# Patient Record
Sex: MALE | Race: WHITE | NOT HISPANIC OR LATINO | Employment: FULL TIME | URBAN - METROPOLITAN AREA
[De-identification: names, ages, dates, MRNs, and addresses within clinical notes are randomized per-mention and may not be internally consistent; named-entity substitution may affect disease eponyms.]

---

## 2022-10-22 ENCOUNTER — HOSPITAL ENCOUNTER (EMERGENCY)
Facility: CLINIC | Age: 27
Discharge: HOME | End: 2022-10-22
Attending: EMERGENCY MEDICINE | Admitting: EMERGENCY MEDICINE

## 2022-10-22 ENCOUNTER — APPOINTMENT (OUTPATIENT)
Dept: CT IMAGING | Facility: CLINIC | Age: 27
End: 2022-10-22
Attending: EMERGENCY MEDICINE

## 2022-10-22 VITALS
WEIGHT: 176.37 LBS | DIASTOLIC BLOOD PRESSURE: 69 MMHG | TEMPERATURE: 98 F | OXYGEN SATURATION: 100 % | HEART RATE: 79 BPM | SYSTOLIC BLOOD PRESSURE: 127 MMHG | RESPIRATION RATE: 20 BRPM

## 2022-10-22 DIAGNOSIS — N10 PYELONEPHRITIS, ACUTE: ICD-10-CM

## 2022-10-22 LAB
ALBUMIN UR-MCNC: 200 MG/DL
ANION GAP SERPL CALCULATED.3IONS-SCNC: 10 MMOL/L (ref 7–15)
APPEARANCE UR: ABNORMAL
BACTERIA #/AREA URNS HPF: ABNORMAL /HPF
BASOPHILS # BLD AUTO: 0.1 10E3/UL (ref 0–0.2)
BASOPHILS NFR BLD AUTO: 0 %
BILIRUB UR QL STRIP: NEGATIVE
BUN SERPL-MCNC: 11.8 MG/DL (ref 6–20)
CALCIUM SERPL-MCNC: 9.8 MG/DL (ref 8.6–10)
CAOX CRY #/AREA URNS HPF: ABNORMAL /HPF
CHLORIDE SERPL-SCNC: 99 MMOL/L (ref 98–107)
COLOR UR AUTO: YELLOW
CREAT SERPL-MCNC: 0.99 MG/DL (ref 0.67–1.17)
DEPRECATED HCO3 PLAS-SCNC: 27 MMOL/L (ref 22–29)
EOSINOPHIL # BLD AUTO: 0.1 10E3/UL (ref 0–0.7)
EOSINOPHIL NFR BLD AUTO: 0 %
ERYTHROCYTE [DISTWIDTH] IN BLOOD BY AUTOMATED COUNT: 12.1 % (ref 10–15)
GFR SERPL CREATININE-BSD FRML MDRD: >90 ML/MIN/1.73M2
GLUCOSE SERPL-MCNC: 89 MG/DL (ref 70–99)
GLUCOSE UR STRIP-MCNC: NEGATIVE MG/DL
HCT VFR BLD AUTO: 42.9 % (ref 40–53)
HGB BLD-MCNC: 14 G/DL (ref 13.3–17.7)
HGB UR QL STRIP: NEGATIVE
HYALINE CASTS: 2 /LPF
IMM GRANULOCYTES # BLD: 0.1 10E3/UL
IMM GRANULOCYTES NFR BLD: 0 %
KETONES UR STRIP-MCNC: ABNORMAL MG/DL
LEUKOCYTE ESTERASE UR QL STRIP: ABNORMAL
LYMPHOCYTES # BLD AUTO: 2.6 10E3/UL (ref 0.8–5.3)
LYMPHOCYTES NFR BLD AUTO: 14 %
MCH RBC QN AUTO: 30.3 PG (ref 26.5–33)
MCHC RBC AUTO-ENTMCNC: 32.6 G/DL (ref 31.5–36.5)
MCV RBC AUTO: 93 FL (ref 78–100)
MONOCYTES # BLD AUTO: 1.3 10E3/UL (ref 0–1.3)
MONOCYTES NFR BLD AUTO: 7 %
MUCOUS THREADS #/AREA URNS LPF: PRESENT /LPF
NEUTROPHILS # BLD AUTO: 14.1 10E3/UL (ref 1.6–8.3)
NEUTROPHILS NFR BLD AUTO: 79 %
NITRATE UR QL: NEGATIVE
NRBC # BLD AUTO: 0 10E3/UL
NRBC BLD AUTO-RTO: 0 /100
PH UR STRIP: 6.5 [PH] (ref 5–7)
PLATELET # BLD AUTO: 338 10E3/UL (ref 150–450)
POTASSIUM SERPL-SCNC: 4 MMOL/L (ref 3.4–5.3)
RBC # BLD AUTO: 4.62 10E6/UL (ref 4.4–5.9)
RBC URINE: 9 /HPF
SODIUM SERPL-SCNC: 136 MMOL/L (ref 136–145)
SP GR UR STRIP: 1.03 (ref 1–1.03)
SPERM #/AREA URNS HPF: PRESENT /HPF
UROBILINOGEN UR STRIP-MCNC: NORMAL MG/DL
WBC # BLD AUTO: 18.2 10E3/UL (ref 4–11)
WBC URINE: 154 /HPF

## 2022-10-22 PROCEDURE — 80048 BASIC METABOLIC PNL TOTAL CA: CPT | Performed by: EMERGENCY MEDICINE

## 2022-10-22 PROCEDURE — 74176 CT ABD & PELVIS W/O CONTRAST: CPT

## 2022-10-22 PROCEDURE — 96365 THER/PROPH/DIAG IV INF INIT: CPT

## 2022-10-22 PROCEDURE — 87086 URINE CULTURE/COLONY COUNT: CPT | Performed by: EMERGENCY MEDICINE

## 2022-10-22 PROCEDURE — 36415 COLL VENOUS BLD VENIPUNCTURE: CPT | Performed by: EMERGENCY MEDICINE

## 2022-10-22 PROCEDURE — 81001 URINALYSIS AUTO W/SCOPE: CPT | Performed by: EMERGENCY MEDICINE

## 2022-10-22 PROCEDURE — 96361 HYDRATE IV INFUSION ADD-ON: CPT

## 2022-10-22 PROCEDURE — 96375 TX/PRO/DX INJ NEW DRUG ADDON: CPT

## 2022-10-22 PROCEDURE — 85025 COMPLETE CBC W/AUTO DIFF WBC: CPT | Performed by: EMERGENCY MEDICINE

## 2022-10-22 PROCEDURE — 99285 EMERGENCY DEPT VISIT HI MDM: CPT | Mod: 25

## 2022-10-22 PROCEDURE — 250N000011 HC RX IP 250 OP 636: Performed by: EMERGENCY MEDICINE

## 2022-10-22 PROCEDURE — 258N000003 HC RX IP 258 OP 636: Performed by: EMERGENCY MEDICINE

## 2022-10-22 RX ORDER — CIPROFLOXACIN 2 MG/ML
400 INJECTION, SOLUTION INTRAVENOUS ONCE
Status: COMPLETED | OUTPATIENT
Start: 2022-10-22 | End: 2022-10-22

## 2022-10-22 RX ORDER — CIPROFLOXACIN 500 MG/1
500 TABLET, FILM COATED ORAL 2 TIMES DAILY
Qty: 20 TABLET | Refills: 0 | Status: SHIPPED | OUTPATIENT
Start: 2022-10-22 | End: 2022-11-01

## 2022-10-22 RX ORDER — KETOROLAC TROMETHAMINE 15 MG/ML
15 INJECTION, SOLUTION INTRAMUSCULAR; INTRAVENOUS ONCE
Status: COMPLETED | OUTPATIENT
Start: 2022-10-22 | End: 2022-10-22

## 2022-10-22 RX ADMIN — SODIUM CHLORIDE 1000 ML: 9 INJECTION, SOLUTION INTRAVENOUS at 16:45

## 2022-10-22 RX ADMIN — CIPROFLOXACIN 400 MG: 2 INJECTION, SOLUTION INTRAVENOUS at 18:37

## 2022-10-22 RX ADMIN — KETOROLAC TROMETHAMINE 15 MG: 15 INJECTION, SOLUTION INTRAMUSCULAR; INTRAVENOUS at 16:16

## 2022-10-22 ASSESSMENT — ENCOUNTER SYMPTOMS
HEMATURIA: 0
CHILLS: 1
FLANK PAIN: 1
VOMITING: 0
FEVER: 0
DIARRHEA: 0
DYSURIA: 0

## 2022-10-22 ASSESSMENT — ACTIVITIES OF DAILY LIVING (ADL)
ADLS_ACUITY_SCORE: 35
ADLS_ACUITY_SCORE: 35

## 2022-10-22 NOTE — ED PROVIDER NOTES
History   Chief Complaint:  Flank Pain     The history is provided by the patient.      Mateus Wilkes is a 27 year old male with history of anxiety and depression who presents with bilateral flank pain which has been constant since this morning. The patient reports that he has been having intermittent pressure just inferior to his posterior ribs for the last month which typically worsens at night. This morning his pain became sharp and constant and was exacerbated after taking a dose of Azo, prompting him to present to the ED for evaluation. Presently, he rates his pain as an 8/10 severity and notes having associated chills and urinary urgency. He is unsure of any recent fever and denies any dysuria, hematuria, or emesis. The patient endorses having a history of anxiety and depression which he used to take daily medications for but is not on currently. He denies taking any other medications and has no other health problems.    Review of Systems   Constitutional: Positive for chills. Negative for fever.   Gastrointestinal: Negative for diarrhea and vomiting.   Genitourinary: Positive for flank pain and urgency. Negative for dysuria and hematuria.   All other systems reviewed and are negative.    Allergies:  Cefaclor    Medications:  The patient is not currently taking any prescribed medications.    Past Medical History:     ADHD  Varicose vein ligation  MTHFR mutation  Anxiety  Depression  Insomnia    Past Surgical History:    [place] teeth extraction    Family History:    Hyperlipidemia  Hypertension  Alcohol abuse  Depression     Social History:  The patient presents to the ED alone.  The patient arrived to the ED in a private vehicle.    Physical Exam     Patient Vitals for the past 24 hrs:   BP Temp Temp src Pulse Resp SpO2 Weight   10/22/22 1820 126/77 -- -- 79 -- 100 % --   10/22/22 1251 -- -- -- -- -- -- 80 kg (176 lb 5.9 oz)   10/22/22 1248 112/81 100.2  F (37.9  C) Temporal (!) 127 20 98 % --     Physical  Exam  Gen: well appearing, in no acute distress  Oral : Mucous membranes moist,   Nose: No rhinorhea  Ears: External near normal, without drainage  Eyes: periorbital tissues and sclera normal   Neck: supple, no abnormal swelling  Lungs: Clear bilaterally, no tachypnea or distress, speaks full sentences  CV: Mildly tachycardic, regular  Abd: soft, nontender, nondistended, no rebound/guarding  Ext: no lower extremity edema  Skin: warm, dry, well perfused, no rashes/bruising/lesions on exposed skin  Neuro: alert, no gross motor or sensory deficits,   Psych: pleasant mood, normal affect      Emergency Department Course     Imaging:  Abd/pelvis CT no contrast - Stone Protocol   Final Result   IMPRESSION:       1.  No urinary calculi, collecting system dilation, or specific cause of flank pain identified.           Report per radiology    Laboratory:  Labs Ordered and Resulted from Time of ED Arrival to Time of ED Departure   ROUTINE UA WITH MICROSCOPIC REFLEX TO CULTURE - Abnormal       Result Value    Color Urine Yellow      Appearance Urine Slightly Cloudy (*)     Glucose Urine Negative      Bilirubin Urine Negative      Ketones Urine Trace (*)     Specific Gravity Urine 1.032      Blood Urine Negative      pH Urine 6.5      Protein Albumin Urine 200 (*)     Urobilinogen Urine Normal      Nitrite Urine Negative      Leukocyte Esterase Urine Large (*)     Bacteria Urine Few (*)     Mucus Urine Present (*)     Calcium Oxalate Crystals Urine Few (*)     Sperm Urine Present (*)     RBC Urine 9 (*)     WBC Urine 154 (*)     Hyaline Casts Urine 2     CBC WITH PLATELETS AND DIFFERENTIAL - Abnormal    WBC Count 18.2 (*)     RBC Count 4.62      Hemoglobin 14.0      Hematocrit 42.9      MCV 93      MCH 30.3      MCHC 32.6      RDW 12.1      Platelet Count 338      % Neutrophils 79      % Lymphocytes 14      % Monocytes 7      % Eosinophils 0      % Basophils 0      % Immature Granulocytes 0      NRBCs per 100 WBC 0      Absolute  Neutrophils 14.1 (*)     Absolute Lymphocytes 2.6      Absolute Monocytes 1.3      Absolute Eosinophils 0.1      Absolute Basophils 0.1      Absolute Immature Granulocytes 0.1      Absolute NRBCs 0.0     BASIC METABOLIC PANEL - Normal    Sodium 136      Potassium 4.0      Chloride 99      Carbon Dioxide (CO2) 27      Anion Gap 10      Urea Nitrogen 11.8      Creatinine 0.99      Calcium 9.8      Glucose 89      GFR Estimate >90     URINE CULTURE      Procedures      Emergency Department Course:     Reviewed:  I reviewed nursing notes, vitals, past medical history and Care Everywhere    Assessments:  1602 I obtained history and examined the patient as noted above.       Consults:      Interventions:  1616 Toradol 15 mg IV  1645 NS 1 L IV    Disposition:  The patient was discharged to home.     Impression & Plan     Medical Decision Making:  Patient presents emergency department low-grade fever dysuria.  UA is consistent with infection.  Is complaining of bilateral flank pain, so I did obtain a CT scan to make sure there is no evidence of ureterolithiasis.  CT negative for that.  Has some leukocytosis as well was febrile on arrival with some mild tachycardia.  This resolved after IV fluid bolus bolus and some Toradol.  He does not appear toxic, does not appear to be suffering from severe sepsis or sepsis shock.  We will give him an IV dose of ciprofloxacin and write prescription for that for him to continue to use at home.  Discussed sexually-transmitted infections with the patient he does not feel like he is high risk as he has not had intercourse for several months and was in a monogamous relationship up until several months ago.    Diagnosis:    ICD-10-CM    1. Pyelonephritis, acute  N10           Discharge Medications:  New Prescriptions    CIPROFLOXACIN (CIPRO) 500 MG TABLET    Take 1 tablet (500 mg) by mouth 2 times daily for 10 days     Scribe Disclosure:  Yvonne SOLOMON, am serving as a scribe at 4:02 PM  on 10/22/2022 to document services personally performed by John Case MD based on my observations and the provider's statements to me.          John Case MD  10/22/22 0708

## 2022-10-24 LAB — BACTERIA UR CULT: NORMAL

## 2022-10-24 NOTE — RESULT ENCOUNTER NOTE
Final urine culture report is negative.  Adult Negative Urine culture parameters per protocol: Any # Urogenital single or mixed organism, <10,000 col/ml single organism (cath/midstream), and > 3 organisms (No susceptibilities performed).  [facility] Emergency Dept discharge antibiotic prescribed (If applicable): Ciprofloxacin  Treatment recommendations per [facility] ED Lab Result Urine Culture protocol.

## 2022-10-31 ENCOUNTER — NURSE TRIAGE (OUTPATIENT)
Dept: NURSING | Facility: CLINIC | Age: 27
End: 2022-10-31

## 2022-10-31 NOTE — TELEPHONE ENCOUNTER
Patient was in ED for back pain.  Patient was started on antibiotics for a kidney infection.  He has 2 more days left and he states he still has symptoms.  He still has urgency, when he has a bowel movement his lower back pain flares up.    Patient denies fever, is able to urinate and no blood in urine.      Patient has no PCP and no insurance.  He wants to follow up but doesn't want to go to ED.    Explained he should follow up since symptoms are not gone.  Recommended a Urgent Care/WIC.  He is currently living in [facility].  [facility]  Gave him the address and times.  Patient has Wednesday off and will plan to go there in the morning.      Tova Tracy RN   10/31/22 5:41 PM  [facility] Nurse Advisor    Reason for Disposition    [1] Finished taking antibiotics AND [2] symptoms are BETTER but [3] not completely gone    Additional Information    Negative: SEVERE difficulty breathing (e.g., struggling for each breath, speaks in single words)    Negative: Sounds like a life-threatening emergency to the triager    Negative: [1] Recent hospitalization for pneumonia AND [2] taking an antibiotic    Negative: [1] Animal bite infection AND [2] taking an antibiotic    Negative: [1] Cellulitis AND [2] taking an antibiotic    Negative: [1] Ear  infection (Otitis Media) AND [2] taking an antibiotic    Negative: [1] Ear  infection (Swimmer's Ear) AND [2] taking an antibiotic    Negative: [1] Sinus infection AND [2] taking an antibiotic    Negative: [1] Strep throat AND [2] taking an antibiotic    Negative: [1] Urinary tract  infection (e.g., cystitis, pyelonephritis, urethritis, epididymitis) AND [2] male AND [3] taking an antibiotic    Negative: [1] Urinary tract  infection (e.g., cystitis, pyelonephritis, urethritis) AND [2] female AND [3] taking an antibiotic    Negative: [1] Wound infection AND [2] taking an antibiotic    Negative: MODERATE difficulty breathing (e.g., speaks in  phrases, SOB even at rest, pulse 100-120)    Negative: Fever > 103 F (39.4 C)    Negative: Patient sounds very sick or weak to the triager    Negative: [1] Taking antibiotics > 24 hours AND [2] symptoms WORSE    Negative: [1] Recent hospitalization AND [2] symptoms WORSE    Negative: [1] Diabetes mellitus or weak immune system (e.g., HIV positive, cancer chemo, splenectomy, organ transplant, chronic steroids) AND [2] new-onset of fever > 100.0 F (37.8 C)    Negative: [1] Caller has URGENT question AND [2] triager unable to answer question    Negative: [1] Taking antibiotic AND [2] new-onset of fever    Negative: [1] Taking antibiotic > 48 hours (2 days) AND [2] fever still present (SAME)    Negative: [1] Taking antibiotic > 72 hours (3 days) AND [2] symptoms (other than fever) not improved    Negative: [1] Caller has NON-URGENT question AND [2] triager unable to answer question    Protocols used: INFECTION ON ANTIBIOTIC FOLLOW-UP CALL-A-

## 2023-02-11 ENCOUNTER — HEALTH MAINTENANCE LETTER (OUTPATIENT)
Age: 28
End: 2023-02-11

## 2024-03-09 ENCOUNTER — HEALTH MAINTENANCE LETTER (OUTPATIENT)
Age: 29
End: 2024-03-09

## 2025-03-16 ENCOUNTER — HEALTH MAINTENANCE LETTER (OUTPATIENT)
Age: 30
End: 2025-03-16